# Patient Record
Sex: MALE | Race: WHITE | NOT HISPANIC OR LATINO
[De-identification: names, ages, dates, MRNs, and addresses within clinical notes are randomized per-mention and may not be internally consistent; named-entity substitution may affect disease eponyms.]

---

## 2017-07-24 ENCOUNTER — APPOINTMENT (OUTPATIENT)
Dept: FAMILY MEDICINE | Facility: CLINIC | Age: 82
End: 2017-07-24

## 2017-07-24 VITALS
RESPIRATION RATE: 16 BRPM | TEMPERATURE: 98.9 F | BODY MASS INDEX: 28.16 KG/M2 | SYSTOLIC BLOOD PRESSURE: 110 MMHG | WEIGHT: 167 LBS | HEIGHT: 64.75 IN | DIASTOLIC BLOOD PRESSURE: 70 MMHG | OXYGEN SATURATION: 97 % | HEART RATE: 71 BPM

## 2017-07-24 DIAGNOSIS — Z87.09 PERSONAL HISTORY OF OTHER DISEASES OF THE RESPIRATORY SYSTEM: ICD-10-CM

## 2017-08-03 ENCOUNTER — APPOINTMENT (OUTPATIENT)
Dept: FAMILY MEDICINE | Facility: CLINIC | Age: 82
End: 2017-08-03
Payer: MEDICARE

## 2017-08-03 VITALS
HEART RATE: 76 BPM | DIASTOLIC BLOOD PRESSURE: 60 MMHG | SYSTOLIC BLOOD PRESSURE: 110 MMHG | OXYGEN SATURATION: 98 % | BODY MASS INDEX: 27.87 KG/M2 | TEMPERATURE: 98.5 F | HEIGHT: 64.75 IN | WEIGHT: 165.25 LBS

## 2017-08-03 DIAGNOSIS — Z87.09 PERSONAL HISTORY OF OTHER DISEASES OF THE RESPIRATORY SYSTEM: ICD-10-CM

## 2017-08-03 PROCEDURE — 99214 OFFICE O/P EST MOD 30 MIN: CPT

## 2017-09-22 ENCOUNTER — RX RENEWAL (OUTPATIENT)
Age: 82
End: 2017-09-22

## 2017-11-15 ENCOUNTER — APPOINTMENT (OUTPATIENT)
Dept: FAMILY MEDICINE | Facility: CLINIC | Age: 82
End: 2017-11-15
Payer: MEDICARE

## 2017-11-15 VITALS
OXYGEN SATURATION: 98 % | HEIGHT: 64.75 IN | BODY MASS INDEX: 27.83 KG/M2 | DIASTOLIC BLOOD PRESSURE: 72 MMHG | WEIGHT: 165 LBS | SYSTOLIC BLOOD PRESSURE: 130 MMHG | HEART RATE: 67 BPM

## 2017-11-15 PROCEDURE — G0009: CPT

## 2017-11-15 PROCEDURE — 90670 PCV13 VACCINE IM: CPT

## 2017-11-15 PROCEDURE — 36415 COLL VENOUS BLD VENIPUNCTURE: CPT

## 2017-11-15 PROCEDURE — 99214 OFFICE O/P EST MOD 30 MIN: CPT | Mod: 25

## 2017-11-15 RX ORDER — AZITHROMYCIN 250 MG/1
250 TABLET, FILM COATED ORAL
Qty: 1 | Refills: 0 | Status: DISCONTINUED | COMMUNITY
Start: 2017-08-03 | End: 2017-11-15

## 2017-11-16 LAB
ALBUMIN SERPL ELPH-MCNC: 4.2 G/DL
ALP BLD-CCNC: 57 U/L
ALT SERPL-CCNC: 43 U/L
ANION GAP SERPL CALC-SCNC: 15 MMOL/L
AST SERPL-CCNC: 35 U/L
BILIRUB SERPL-MCNC: 0.4 MG/DL
BUN SERPL-MCNC: 11 MG/DL
CALCIUM SERPL-MCNC: 9.9 MG/DL
CHLORIDE SERPL-SCNC: 104 MMOL/L
CHOLEST SERPL-MCNC: 196 MG/DL
CHOLEST/HDLC SERPL: 5.3 RATIO
CO2 SERPL-SCNC: 24 MMOL/L
CREAT SERPL-MCNC: 0.79 MG/DL
GLUCOSE SERPL-MCNC: 134 MG/DL
HBA1C MFR BLD HPLC: 6 %
HDLC SERPL-MCNC: 37 MG/DL
LDLC SERPL CALC-MCNC: 123 MG/DL
POTASSIUM SERPL-SCNC: 4.7 MMOL/L
PROT SERPL-MCNC: 7.5 G/DL
SODIUM SERPL-SCNC: 143 MMOL/L
TRIGL SERPL-MCNC: 182 MG/DL

## 2017-11-17 ENCOUNTER — RX RENEWAL (OUTPATIENT)
Age: 82
End: 2017-11-17

## 2018-11-21 ENCOUNTER — RX RENEWAL (OUTPATIENT)
Age: 83
End: 2018-11-21

## 2019-02-28 ENCOUNTER — APPOINTMENT (OUTPATIENT)
Dept: FAMILY MEDICINE | Facility: CLINIC | Age: 84
End: 2019-02-28
Payer: MEDICARE

## 2019-02-28 VITALS
TEMPERATURE: 98.01 F | WEIGHT: 170 LBS | HEART RATE: 84 BPM | HEIGHT: 65 IN | DIASTOLIC BLOOD PRESSURE: 74 MMHG | BODY MASS INDEX: 28.32 KG/M2 | SYSTOLIC BLOOD PRESSURE: 126 MMHG | OXYGEN SATURATION: 97 %

## 2019-02-28 DIAGNOSIS — E78.00 PURE HYPERCHOLESTEROLEMIA, UNSPECIFIED: ICD-10-CM

## 2019-02-28 PROCEDURE — G0439: CPT

## 2019-02-28 NOTE — HEALTH RISK ASSESSMENT
[Good] : ~his/her~  mood as  good [No falls in past year] : Patient reported no falls in the past year [Patient reported colonoscopy was normal] : Patient reported colonoscopy was normal [With Family] : lives with family [Fully functional (bathing, dressing, toileting, transferring, walking, feeding)] : Fully functional (bathing, dressing, toileting, transferring, walking, feeding) [Fully functional (using the telephone, shopping, preparing meals, housekeeping, doing laundry, using] : Fully functional and needs no help or supervision to perform IADLs (using the telephone, shopping, preparing meals, housekeeping, doing laundry, using transportation, managing medications and managing finances) [Reports changes in hearing] : Reports changes in hearing [Seat Belt] :  uses seat belt [Aggressive treatment] : aggressive treatment [30 or more] : 30 or more [] : No [YearQuit] : 2009 [de-identified] : Occasional [de-identified] : Active [de-identified] : healthy [0] : 0 [Change in mental status noted] : No change in mental status noted [Sexually Active] : not sexually active [Reports changes in dental health] : Reports no changes in dental health [ColonoscopyDate] : 2011 [Designated Healthcare Proxy] : Designated healthcare proxy [AdvancecareDate] : Today

## 2019-02-28 NOTE — PHYSICAL EXAM
[No Acute Distress] : no acute distress [Clear to Auscultation] : lungs were clear to auscultation bilaterally [Regular Rhythm] : with a regular rhythm [No Murmur] : no murmur heard [No Carotid Bruits] : no carotid bruits [No Edema] : there was no peripheral edema [Soft] : abdomen soft [No Rash] : no rash [Normal Insight/Judgement] : insight and judgment were intact

## 2019-02-28 NOTE — HISTORY OF PRESENT ILLNESS
[FreeTextEntry1] : Yearly physical [de-identified] : Status post coronary stents several years ago, has no cardiovascular symptoms with exertion. Plans to followup with his cardiologist in the near future. He is physically active . No issues.\par \par Neuropathy well controlled with gabapentin, which he takes 3 times a day told he may try and taper off.\par \par Status post lung resection 27 years ago. No pulmonary complaints

## 2019-02-28 NOTE — ASSESSMENT
[FreeTextEntry1] : Status post coronary stents. Patient remains asymptomatic. Followup with cardiologist.\par \par Mild paresthesias. Patient will try and discontinue gabapentin\par \par \par Patient had lobectomy 27 years ago for lung cancer. CAT scan in 2016 was unremarkable. Patient qualifies for lung cancer screen. Low-dose CT ordered

## 2019-03-01 LAB
ALBUMIN SERPL ELPH-MCNC: 4.6 G/DL
ALP BLD-CCNC: 63 U/L
ALT SERPL-CCNC: 45 U/L
ANION GAP SERPL CALC-SCNC: 13 MMOL/L
AST SERPL-CCNC: 34 U/L
BASOPHILS # BLD AUTO: 0.07 K/UL
BASOPHILS NFR BLD AUTO: 0.8 %
BILIRUB SERPL-MCNC: 0.6 MG/DL
BUN SERPL-MCNC: 9 MG/DL
CALCIUM SERPL-MCNC: 9.8 MG/DL
CHLORIDE SERPL-SCNC: 101 MMOL/L
CHOLEST SERPL-MCNC: 183 MG/DL
CHOLEST/HDLC SERPL: 5 RATIO
CO2 SERPL-SCNC: 28 MMOL/L
CREAT SERPL-MCNC: 0.76 MG/DL
EOSINOPHIL # BLD AUTO: 0.26 K/UL
EOSINOPHIL NFR BLD AUTO: 2.9 %
GLUCOSE SERPL-MCNC: 146 MG/DL
HBA1C MFR BLD HPLC: 6.4 %
HCT VFR BLD CALC: 49.6 %
HDLC SERPL-MCNC: 37 MG/DL
HGB BLD-MCNC: 16.7 G/DL
IMM GRANULOCYTES NFR BLD AUTO: 0.3 %
LDLC SERPL CALC-MCNC: 100 MG/DL
LYMPHOCYTES # BLD AUTO: 1.8 K/UL
LYMPHOCYTES NFR BLD AUTO: 19.9 %
MAN DIFF?: NORMAL
MCHC RBC-ENTMCNC: 30 PG
MCHC RBC-ENTMCNC: 33.7 GM/DL
MCV RBC AUTO: 89.2 FL
MONOCYTES # BLD AUTO: 0.78 K/UL
MONOCYTES NFR BLD AUTO: 8.6 %
NEUTROPHILS # BLD AUTO: 6.09 K/UL
NEUTROPHILS NFR BLD AUTO: 67.5 %
PLATELET # BLD AUTO: 187 K/UL
POTASSIUM SERPL-SCNC: 4.4 MMOL/L
PROT SERPL-MCNC: 7.1 G/DL
RBC # BLD: 5.56 M/UL
RBC # FLD: 12.7 %
SODIUM SERPL-SCNC: 142 MMOL/L
TRIGL SERPL-MCNC: 232 MG/DL
WBC # FLD AUTO: 9.03 K/UL

## 2019-03-07 ENCOUNTER — APPOINTMENT (OUTPATIENT)
Dept: CT IMAGING | Facility: CLINIC | Age: 84
End: 2019-03-07

## 2019-03-11 ENCOUNTER — FORM ENCOUNTER (OUTPATIENT)
Age: 84
End: 2019-03-11

## 2019-03-12 ENCOUNTER — APPOINTMENT (OUTPATIENT)
Dept: RADIOLOGY | Facility: CLINIC | Age: 84
End: 2019-03-12
Payer: MEDICARE

## 2019-03-12 ENCOUNTER — OUTPATIENT (OUTPATIENT)
Dept: OUTPATIENT SERVICES | Facility: HOSPITAL | Age: 84
LOS: 1 days | End: 2019-03-12
Payer: MEDICARE

## 2019-03-12 DIAGNOSIS — Z98.890 OTHER SPECIFIED POSTPROCEDURAL STATES: ICD-10-CM

## 2019-03-12 PROCEDURE — 71046 X-RAY EXAM CHEST 2 VIEWS: CPT | Mod: 26

## 2019-03-12 PROCEDURE — 71046 X-RAY EXAM CHEST 2 VIEWS: CPT

## 2019-10-16 ENCOUNTER — APPOINTMENT (OUTPATIENT)
Dept: FAMILY MEDICINE | Facility: CLINIC | Age: 84
End: 2019-10-16
Payer: MEDICARE

## 2019-10-16 VITALS
SYSTOLIC BLOOD PRESSURE: 136 MMHG | HEIGHT: 65 IN | BODY MASS INDEX: 27.82 KG/M2 | DIASTOLIC BLOOD PRESSURE: 60 MMHG | RESPIRATION RATE: 16 BRPM | OXYGEN SATURATION: 97 % | WEIGHT: 167 LBS | HEART RATE: 61 BPM

## 2019-10-16 DIAGNOSIS — R53.81 OTHER MALAISE: ICD-10-CM

## 2019-10-16 PROCEDURE — 36415 COLL VENOUS BLD VENIPUNCTURE: CPT

## 2019-10-16 PROCEDURE — 99214 OFFICE O/P EST MOD 30 MIN: CPT | Mod: 25

## 2019-10-16 NOTE — ASSESSMENT
[FreeTextEntry1] : Brief episode of feeling unwell has since resolved completely. Follow up as needed as routine appointment with cardiology this week

## 2019-10-16 NOTE — HISTORY OF PRESENT ILLNESS
[de-identified] : the patient was evaluated in the emergency room October 9.He awoke feeling a little "off" nothing specific.Felt a little worse as the morning went on. Wife insisted he be evaluated in the emergency room Workup was negative by the time he was discharged he felt completely back to normal. He denies any confusion

## 2019-10-16 NOTE — PHYSICAL EXAM
[Normal] : normal rate, regular rhythm, normal S1 and S2 and no murmur heard [Pedal Pulses Present] : the pedal pulses are present [Coordination Grossly Intact] : coordination grossly intact [No Focal Deficits] : no focal deficits [de-identified] : Examination of feet. Normal vibratory sense normal light touch. Deep tendon reflexes 1+

## 2019-10-17 LAB
CHOLEST SERPL-MCNC: 160 MG/DL
CHOLEST/HDLC SERPL: 4.7 RATIO
ESTIMATED AVERAGE GLUCOSE: 128 MG/DL
HBA1C MFR BLD HPLC: 6.1 %
HDLC SERPL-MCNC: 34 MG/DL
LDLC SERPL CALC-MCNC: 98 MG/DL
TRIGL SERPL-MCNC: 141 MG/DL

## 2019-10-23 ENCOUNTER — OUTPATIENT (OUTPATIENT)
Dept: OUTPATIENT SERVICES | Facility: HOSPITAL | Age: 84
LOS: 1 days | End: 2019-10-23
Payer: MEDICARE

## 2019-10-23 VITALS — HEIGHT: 66 IN | WEIGHT: 169.09 LBS

## 2019-10-23 VITALS
RESPIRATION RATE: 18 BRPM | SYSTOLIC BLOOD PRESSURE: 128 MMHG | DIASTOLIC BLOOD PRESSURE: 61 MMHG | TEMPERATURE: 98 F | HEIGHT: 66 IN | WEIGHT: 169.09 LBS | OXYGEN SATURATION: 96 % | HEART RATE: 62 BPM

## 2019-10-23 DIAGNOSIS — R06.02 SHORTNESS OF BREATH: ICD-10-CM

## 2019-10-23 DIAGNOSIS — Z01.818 ENCOUNTER FOR OTHER PREPROCEDURAL EXAMINATION: ICD-10-CM

## 2019-10-23 DIAGNOSIS — Z95.5 PRESENCE OF CORONARY ANGIOPLASTY IMPLANT AND GRAFT: Chronic | ICD-10-CM

## 2019-10-23 LAB
ALBUMIN SERPL ELPH-MCNC: 4.1 G/DL — SIGNIFICANT CHANGE UP (ref 3.3–5.2)
ALP SERPL-CCNC: 55 U/L — SIGNIFICANT CHANGE UP (ref 40–120)
ALT FLD-CCNC: 35 U/L — SIGNIFICANT CHANGE UP
ANION GAP SERPL CALC-SCNC: 12 MMOL/L — SIGNIFICANT CHANGE UP (ref 5–17)
APTT BLD: 31 SEC — SIGNIFICANT CHANGE UP (ref 27.5–36.3)
AST SERPL-CCNC: 27 U/L — SIGNIFICANT CHANGE UP
BILIRUB SERPL-MCNC: 0.5 MG/DL — SIGNIFICANT CHANGE UP (ref 0.4–2)
BUN SERPL-MCNC: 13 MG/DL — SIGNIFICANT CHANGE UP (ref 8–20)
CALCIUM SERPL-MCNC: 9.3 MG/DL — SIGNIFICANT CHANGE UP (ref 8.6–10.2)
CHLORIDE SERPL-SCNC: 106 MMOL/L — SIGNIFICANT CHANGE UP (ref 98–107)
CO2 SERPL-SCNC: 24 MMOL/L — SIGNIFICANT CHANGE UP (ref 22–29)
CREAT SERPL-MCNC: 0.61 MG/DL — SIGNIFICANT CHANGE UP (ref 0.5–1.3)
GLUCOSE SERPL-MCNC: 136 MG/DL — HIGH (ref 70–115)
HCT VFR BLD CALC: 45.5 % — SIGNIFICANT CHANGE UP (ref 39–50)
HGB BLD-MCNC: 15.6 G/DL — SIGNIFICANT CHANGE UP (ref 13–17)
INR BLD: 0.98 RATIO — SIGNIFICANT CHANGE UP (ref 0.88–1.16)
MCHC RBC-ENTMCNC: 30.3 PG — SIGNIFICANT CHANGE UP (ref 27–34)
MCHC RBC-ENTMCNC: 34.3 GM/DL — SIGNIFICANT CHANGE UP (ref 32–36)
MCV RBC AUTO: 88.3 FL — SIGNIFICANT CHANGE UP (ref 80–100)
PLATELET # BLD AUTO: 186 K/UL — SIGNIFICANT CHANGE UP (ref 150–400)
POTASSIUM SERPL-MCNC: 4.1 MMOL/L — SIGNIFICANT CHANGE UP (ref 3.5–5.3)
POTASSIUM SERPL-SCNC: 4.1 MMOL/L — SIGNIFICANT CHANGE UP (ref 3.5–5.3)
PROT SERPL-MCNC: 6.9 G/DL — SIGNIFICANT CHANGE UP (ref 6.6–8.7)
PROTHROM AB SERPL-ACNC: 11.3 SEC — SIGNIFICANT CHANGE UP (ref 10–12.9)
RBC # BLD: 5.15 M/UL — SIGNIFICANT CHANGE UP (ref 4.2–5.8)
RBC # FLD: 12.4 % — SIGNIFICANT CHANGE UP (ref 10.3–14.5)
SODIUM SERPL-SCNC: 142 MMOL/L — SIGNIFICANT CHANGE UP (ref 135–145)
WBC # BLD: 8.26 K/UL — SIGNIFICANT CHANGE UP (ref 3.8–10.5)
WBC # FLD AUTO: 8.26 K/UL — SIGNIFICANT CHANGE UP (ref 3.8–10.5)

## 2019-10-23 PROCEDURE — 93010 ELECTROCARDIOGRAM REPORT: CPT

## 2019-10-23 PROCEDURE — G0463: CPT

## 2019-10-23 PROCEDURE — 36415 COLL VENOUS BLD VENIPUNCTURE: CPT

## 2019-10-23 PROCEDURE — 80053 COMPREHEN METABOLIC PANEL: CPT

## 2019-10-23 PROCEDURE — 85027 COMPLETE CBC AUTOMATED: CPT

## 2019-10-23 PROCEDURE — 85730 THROMBOPLASTIN TIME PARTIAL: CPT

## 2019-10-23 PROCEDURE — 85610 PROTHROMBIN TIME: CPT

## 2019-10-23 PROCEDURE — 93005 ELECTROCARDIOGRAM TRACING: CPT

## 2019-10-23 NOTE — H&P PST ADULT - RS GEN PE MLT RESP DETAILS PC
respirations non-labored/clear to auscultation bilaterally/airway patent/no intercostal retractions/no rales/no chest wall tenderness/normal/good air movement/breath sounds equal

## 2019-10-23 NOTE — H&P PST ADULT - ASSESSMENT
83 y/o M, former smoker, PMHx CAD PCI presents today to PST in anticipation of LHC with Dr. Linton due to increased shortness of breath; abn. stress test.

## 2019-10-23 NOTE — H&P PST ADULT - NSICDXPROBLEM_GEN_ALL_CORE_FT
PROBLEM DIAGNOSES  Problem: Shortness of breath  Assessment and Plan: -LHC to be done with Dr. iLnton  -Procedure d/w pt and daughter, risks and benefits explained, questions answered  -labs ECG and previous card notes reviewed

## 2019-10-23 NOTE — H&P PST ADULT - NEUROLOGICAL DETAILS
responds to pain/responds to verbal commands/alert and oriented x 3/normal strength/deep reflexes intact/cranial nerves intact/no spontaneous movement/sensation intact

## 2019-10-23 NOTE — H&P PST ADULT - NSICDXPASTMEDICALHX_GEN_ALL_CORE_FT
PAST MEDICAL HISTORY:  CAD S/P percutaneous coronary angioplasty stents to LAD and OM in 2006, OM 2 in 2009    CHEW (dyspnea on exertion)     High cholesterol     HTN (hypertension)     MI (myocardial infarction)     Neuropathy

## 2019-10-23 NOTE — H&P PST ADULT - HISTORY OF PRESENT ILLNESS
Narrative: This is an 85 y/o M, former smoker, with a PMHx of CAD/MI/ PCI (LAD, OM1 2006; OM2 2009), HTN, HLD, lung ca (partial lobectomy in 1992) presents today for PST in anticipation of a left heart cardiac catheterization with Dr. Linton.  He recently was in the ER where he was seen and released due to "feeling disoriented and not stable on his feet", however, was stable prior to discharge. He denies chest pain but endorses dyspnea on exertion.     Of note, he had a cardiac catheterization in 2011 that revealed patent stents to the LAD, OM1, and OM2    Nuclear Stress test from 9/19/2019 revealed moderate to severe, basal to mid inferior/ inferolateral, partially reversible at the margins defect, consistent with infarction with mildperi- infarct ischemia, LV function mildly reduced, LV wall motion abnormal, hypokinesis of the proximal to mid inferior and inferiorlateral walls, EF 56%    TTE from 9/5/2019  normal global LV contractility, LV normal with EF 50-55%, mild segmental hypokinesis of LV, diastolic filling pattern indicates impaired relaxation, regional wall motion abnormalities are noted to the basal inferior segment and mid inferolateral segment, mild MR, mild TR, no changes from previous study.    Cardiac Cath 11/17/2009: RFA access, OM2 EMERSON 2.50x 30mm [place] sprint EMERSON, LM normal, LAD widely patent stent, RCA normal    ASA:  Mallampati:  Bleeding Risk:  Creatinine:  GFR: Narrative: This is an 85 y/o M, former smoker (1PPD x55 years, quit 9 years ago), with a PMHx of CAD/MI/ PCI (LAD, OM1 2006; OM2 2009), HTN, HLD, lung ca (partial lobectomy in 1992) presents today for PST in anticipation of a left heart cardiac catheterization with Dr. Linton.  He recently was in the ER where he was seen and released due to "feeling disoriented and not stable on his feet", however, was stable prior to discharge. He reports in the morning prior to coming to the ED; he got out of bed fast and believes his feeling was due to brisk position change.  He denies chest pain, palps, lower extremity swelling; but endorses dyspnea on exertion.     Of note, he had a cardiac catheterization in 2011 that revealed patent stents to the LAD, OM1, and OM2    Nuclear Stress test from 9/19/2019 revealed moderate to severe, basal to mid inferior/ inferolateral, partially reversible at the margins defect, consistent with infarction with mildperi- infarct ischemia, LV function mildly reduced, LV wall motion abnormal, hypokinesis of the proximal to mid inferior and inferiorlateral walls, EF 56%    TTE from 9/5/2019  normal global LV contractility, LV normal with EF 50-55%, mild segmental hypokinesis of LV, diastolic filling pattern indicates impaired relaxation, regional wall motion abnormalities are noted to the basal inferior segment and mid inferolateral segment, mild MR, mild TR, no changes from previous study.    Cardiac Cath 11/17/2009: RFA access, OM2 EMERSON 2.50x 30mm [place] sprint EMERSON, LM normal, LAD widely patent stent, RCA normal    ASA: 2  Mallampati: 2  Bleeding Risk: 1.6%  Creatinine: 0.61  GFR: 7

## 2019-10-29 ENCOUNTER — TRANSCRIPTION ENCOUNTER (OUTPATIENT)
Age: 84
End: 2019-10-29

## 2019-10-29 ENCOUNTER — INPATIENT (INPATIENT)
Facility: HOSPITAL | Age: 84
LOS: 0 days | Discharge: HOME | DRG: 246 | End: 2019-10-30
Attending: INTERNAL MEDICINE | Admitting: EMERGENCY MEDICINE
Payer: COMMERCIAL

## 2019-10-29 VITALS
HEART RATE: 57 BPM | RESPIRATION RATE: 17 BRPM | SYSTOLIC BLOOD PRESSURE: 175 MMHG | OXYGEN SATURATION: 98 % | DIASTOLIC BLOOD PRESSURE: 77 MMHG | TEMPERATURE: 98 F

## 2019-10-29 DIAGNOSIS — R94.39 ABNORMAL RESULT OF OTHER CARDIOVASCULAR FUNCTION STUDY: ICD-10-CM

## 2019-10-29 DIAGNOSIS — Z95.5 PRESENCE OF CORONARY ANGIOPLASTY IMPLANT AND GRAFT: Chronic | ICD-10-CM

## 2019-10-29 LAB — BLD GP AB SCN SERPL QL: SIGNIFICANT CHANGE UP

## 2019-10-29 PROCEDURE — 93010 ELECTROCARDIOGRAM REPORT: CPT

## 2019-10-29 RX ORDER — CLOPIDOGREL BISULFATE 75 MG/1
75 TABLET, FILM COATED ORAL DAILY
Refills: 0 | Status: DISCONTINUED | OUTPATIENT
Start: 2019-10-30 | End: 2019-10-30

## 2019-10-29 RX ORDER — METOPROLOL TARTRATE 50 MG
25 TABLET ORAL DAILY
Refills: 0 | Status: DISCONTINUED | OUTPATIENT
Start: 2019-10-29 | End: 2019-10-30

## 2019-10-29 RX ORDER — SODIUM CHLORIDE 9 MG/ML
1000 INJECTION INTRAMUSCULAR; INTRAVENOUS; SUBCUTANEOUS
Refills: 0 | Status: DISCONTINUED | OUTPATIENT
Start: 2019-10-29 | End: 2019-10-30

## 2019-10-29 RX ORDER — LOVASTATIN 20 MG
2 TABLET ORAL
Qty: 0 | Refills: 0 | DISCHARGE

## 2019-10-29 RX ORDER — METOPROLOL TARTRATE 50 MG
1 TABLET ORAL
Qty: 0 | Refills: 0 | DISCHARGE

## 2019-10-29 RX ORDER — GABAPENTIN 400 MG/1
100 CAPSULE ORAL THREE TIMES A DAY
Refills: 0 | Status: DISCONTINUED | OUTPATIENT
Start: 2019-10-29 | End: 2019-10-30

## 2019-10-29 RX ORDER — ASPIRIN/CALCIUM CARB/MAGNESIUM 324 MG
1 TABLET ORAL
Qty: 0 | Refills: 0 | DISCHARGE

## 2019-10-29 RX ORDER — GABAPENTIN 400 MG/1
1 CAPSULE ORAL
Qty: 0 | Refills: 0 | DISCHARGE

## 2019-10-29 RX ORDER — ATORVASTATIN CALCIUM 80 MG/1
40 TABLET, FILM COATED ORAL AT BEDTIME
Refills: 0 | Status: DISCONTINUED | OUTPATIENT
Start: 2019-10-29 | End: 2019-10-30

## 2019-10-29 RX ORDER — ASPIRIN/CALCIUM CARB/MAGNESIUM 324 MG
81 TABLET ORAL DAILY
Refills: 0 | Status: DISCONTINUED | OUTPATIENT
Start: 2019-10-30 | End: 2019-10-30

## 2019-10-29 RX ORDER — CLOPIDOGREL BISULFATE 75 MG/1
1 TABLET, FILM COATED ORAL
Qty: 0 | Refills: 0 | DISCHARGE

## 2019-10-29 RX ADMIN — ATORVASTATIN CALCIUM 40 MILLIGRAM(S): 80 TABLET, FILM COATED ORAL at 22:38

## 2019-10-29 RX ADMIN — GABAPENTIN 100 MILLIGRAM(S): 400 CAPSULE ORAL at 22:38

## 2019-10-29 NOTE — DISCHARGE NOTE PROVIDER - CARE PROVIDER_API CALL
Angus Ho)  [facility] at [address] [address]  Phone: [phone]  Fax: [phone]  Follow Up Time: 2 weeks

## 2019-10-29 NOTE — DISCHARGE NOTE PROVIDER - NSDCHC_MEDRECLITE_GEN_ALL_CORE
Click to Modify Medication Indication on Note Save
Click to Modify Medication Indication on Note Save

## 2019-10-29 NOTE — DISCHARGE NOTE PROVIDER - CARE PROVIDER_API CALL
Mark Linton)  Cardiovascular Disease; Interventional Cardiology  [address] [place] [address]  Phone: [phone]  Fax: [phone]  Follow Up Time: 2 weeks

## 2019-10-29 NOTE — DISCHARGE NOTE PROVIDER - NSDCACTIVITY_GEN_ALL_CORE
Driving allowed/Do not drive or operate machinery/Return to Work/School allowed/Sex allowed/Stairs allowed/Showering allowed/Walking - Indoors allowed/Walking - Outdoors allowed/Do not make important decisions

## 2019-10-29 NOTE — PROGRESS NOTE ADULT - SUBJECTIVE AND OBJECTIVE BOX
Narrative: This is an 85 y/o M, former smoker (1PPD x55 years, quit 9 years ago), with a PMHx of CAD/MI/ PCI (LAD, OM1 2006; OM2 2009), HTN, HLD, lung ca (partial lobectomy in 1992) presents today for PST in anticipation of a left heart cardiac catheterization with Dr. Linton.  He recently was in the ER where he was seen and released due to "feeling disoriented and not stable on his feet", however, was stable prior to discharge. He reports in the morning prior to coming to the ED; he got out of bed fast and believes his feeling was due to brisk position change.  He denies chest pain, palps, lower extremity swelling; but endorses dyspnea on exertion.     Of note, he had a cardiac catheterization in 2011 that revealed patent stents to the LAD, OM1, and OM2  Physical Exam:  · Constitutional	Well-developed, well nourished	  · Eyes	EOMI; PERRL; no drainage or redness	  · ENMT	No oral lesions; no gross abnormalities	  · Neck	No bruits; no thyromegaly or nodules	  · Respiratory	detailed exam	  · Respiratory Details	normal; airway patent; breath sounds equal; good air movement; respirations non-labored; clear to auscultation bilaterally; no chest wall tenderness; no intercostal retractions; no rales	  · Cardiovascular	detailed exam	  · Cardiovascular Details	regular rate and rhythm  no murmur	  · Gastrointestinal	Soft, non-tender, no hepatosplenomegaly, normal bowel sounds	  · Genitourinary	not examined	  · Rectal	not examined	  · Extremities	No cyanosis, clubbing or edema	  · Vascular	detailed exam	  · Vascular Details	all pulses palpable peripherally, no edema	  · Neurological	detailed exam	  · Neurological Details	alert and oriented x 3; responds to pain; responds to verbal commands; sensation intact; deep reflexes intact; cranial nerves intact; no spontaneous movement; normal strength	  · Musculoskeletal	No joint pain, swelling or deformity; no limitation of movement	  T(C): 36.8 (10-29-19 @ 10:07), Max: 36.8 (10-29-19 @ 10:07)  HR: 48 (10-29-19 @ 16:05) (48 - 57)  BP: 121/58 (10-29-19 @ 16:05) (118/84 - 175/77)  RR: 18 (10-29-19 @ 16:05) (17 - 18)  SpO2: 96% (10-29-19 @ 16:05) (95% - 98%)

## 2019-10-29 NOTE — DISCHARGE NOTE PROVIDER - NSDCMRMEDTOKEN_GEN_ALL_CORE_FT
aspirin 81 mg oral tablet: 1 tab(s) orally once a day  clopidogrel 75 mg oral tablet: 1 tab(s) orally once a day  gabapentin 100 mg oral capsule: 1 cap(s) orally 3 times a day  lovastatin 20 mg oral tablet: 2 tab(s) orally once a day  mature multivitamin: 1 cap(s) orally once a day  Metoprolol Succinate ER 25 mg oral tablet, extended release: 1 tab(s) orally once a day  Super B Complex oral tablet: 1 tab(s) orally once a day
aspirin 81 mg oral tablet: 1 tab(s) orally once a day  clopidogrel 75 mg oral tablet: 1 tab(s) orally once a day  gabapentin 100 mg oral capsule: 1 cap(s) orally 3 times a day  lovastatin 20 mg oral tablet: 2 tab(s) orally once a day  mature multivitamin: 1 cap(s) orally once a day  Metoprolol Succinate ER 25 mg oral tablet, extended release: 1 tab(s) orally once a day  Super B Complex oral tablet: 1 tab(s) orally once a day

## 2019-10-29 NOTE — PROGRESS NOTE ADULT - ASSESSMENT
cardiac rehab info provided/referral and communication to cardiac rehab completed  admit as outpatient in bed  Monitor overnight on tele  ASA and plavix protocol as ordered  Bedrest for 3 hours post procedure  Follow up as outpatient in 7-10 days

## 2019-10-29 NOTE — DISCHARGE NOTE PROVIDER - HOSPITAL COURSE
EMERSON x 4 LCX    Aspirin and plavix protocol emphasized EMERSON x 4 LCX, OM, OM2    Aspirin and plavix protocol emphasized    Cardiac Cath Lab - Adult (10.29.19 @ 13:35)         LM:   --  LM: Normal.    LAD:   --  Proximal LAD: There was a tubular 0 % stenosis at the site of a    prior stent.    --  Mid LAD: There was a diffuse 0 % stenosis at the site of a prior stent.    CX:   --  Proximal circumflex: There was a tubular 70 % stenosis.    --  OM1: There was a tubular 95 % stenosis at the site of a prior stent.    --  OM2: There was a diffuse 100 % stenosis at the site of a prior stent.    RCA:   --  RCA: Normal.    COMPLICATIONS: No complications occurred during the cath lab visit. No    complications occurred during the cath lab visit.    SUMMARY:    1ST LESION INTERVENTIONS: A successful drug-eluting stent with laser    atherectomy was performed on the 100 % lesion in the 2nd obtuse marginal.    Following intervention there was an excellent angiographic appearance with    a 0 % residual stenosis.    2ND LESION INTERVENTIONS: A successful drug-eluting stent with laser    atherectomy was performed on the 95 % lesion in the 1st obtuse marginal.    Following intervention there was an excellent angiographic appearance with    a 0 % residual stenosis.    3RD LESION INTERVENTIONS: A successful drug-eluting stent was performed on    the 70 % lesion in the proximal circumflex. Following intervention there    was an excellent angiographic appearance with a 0 % residual stenosis.    INTERVENTIONAL RECOMMENDATIONS: Continue clopidogrel (Plavix), 75 mg, PO,    daily. Continue aspirin, 81 mg, PO, daily.

## 2019-10-29 NOTE — DISCHARGE NOTE PROVIDER - NSDCCPCAREPLAN_GEN_ALL_CORE_FT
PRINCIPAL DISCHARGE DIAGNOSIS  Diagnosis: CAD in native artery  Assessment and Plan of Treatment:
PRINCIPAL DISCHARGE DIAGNOSIS  Diagnosis: Severe tricuspid regurgitation  Assessment and Plan of Treatment:

## 2019-10-29 NOTE — DISCHARGE NOTE PROVIDER - HOSPITAL COURSE
Narrative: This is an 85 y/o M, former smoker (1PPD x55 years, quit 9 years ago), with a PMHx of CAD/MI/ PCI (LAD, OM1 2006; OM2 2009), HTN, HLD, lung ca (partial lobectomy in 1992) presents today for PST in anticipation of a left heart cardiac catheterization with Dr. Linton.  He recently was in the ER where he was seen and released due to "feeling disoriented and not stable on his feet", however, was stable prior to discharge. He reports in the morning prior to coming to the ED; he got out of bed fast and believes his feeling was due to brisk position change.    He denies chest pain, palps, lower extremity swelling; but endorses dyspnea on exertion.         Of note, he had a cardiac catheterization in 2011 that revealed patent stents to the LAD, OM1, and [id number] Exam:    · Constitutional    Well-developed, well nourished    · Eyes    EOMI; PERRL; no drainage or redness    · ENMT    No oral lesions; no gross abnormalities    · Neck    No bruits; no thyromegaly or nodules    · Respiratory    detailed exam    · Respiratory Details    normal; airway patent; breath sounds equal; good air movement; respirations non-labored; clear to auscultation bilaterally; no chest wall tenderness; no intercostal retractions; no rales    · Cardiovascular    detailed exam    · Cardiovascular Details    regular rate and rhythm  no murmur    · Gastrointestinal    Soft, non-tender, no hepatosplenomegaly, normal bowel sounds    · Genitourinary    not examined    · Rectal    not examined    · Extremities    No cyanosis, clubbing or edema    · Vascular    detailed exam    · Vascular Details    all pulses palpable peripherally, no edema    · Neurological    detailed exam    · Neurological Details    alert and oriented x 3; responds to pain; responds to verbal commands; sensation intact; deep reflexes intact; cranial nerves intact; no spontaneous movement; normal strength    · Musculoskeletal    No joint pain, swelling or deformity; no limitation of movement    T(C): 36.8 (10-29-19 @ 10:07), Max: 36.8 (10-29-19 @ 10:07)    HR: 57 (10-29-19 @ 10:07) (57 - 57)    BP: 175/77 (10-29-19 @ 10:07) (175/77 - 175/77)    RR: 17 (10-29-19 @ 10:07) (17 - 17)    SpO2: 98% (10-29-19 @ 10:07) (98% - 98%)        S/P Moderate MR and Severe TR     Official report pending    OK to d/c home after tolerating po fluids    Follow up as outpatient in 7-10 days

## 2019-10-30 ENCOUNTER — TRANSCRIPTION ENCOUNTER (OUTPATIENT)
Age: 84
End: 2019-10-30

## 2019-10-30 VITALS
RESPIRATION RATE: 14 BRPM | HEART RATE: 57 BPM | SYSTOLIC BLOOD PRESSURE: 129 MMHG | OXYGEN SATURATION: 98 % | DIASTOLIC BLOOD PRESSURE: 69 MMHG

## 2019-10-30 LAB
ABO RH CONFIRMATION: SIGNIFICANT CHANGE UP
ANION GAP SERPL CALC-SCNC: 14 MMOL/L — SIGNIFICANT CHANGE UP (ref 5–17)
BASOPHILS # BLD AUTO: 0.05 K/UL — SIGNIFICANT CHANGE UP (ref 0–0.2)
BASOPHILS NFR BLD AUTO: 0.5 % — SIGNIFICANT CHANGE UP (ref 0–2)
BUN SERPL-MCNC: 9 MG/DL — SIGNIFICANT CHANGE UP (ref 8–20)
CALCIUM SERPL-MCNC: 8.8 MG/DL — SIGNIFICANT CHANGE UP (ref 8.6–10.2)
CHLORIDE SERPL-SCNC: 104 MMOL/L — SIGNIFICANT CHANGE UP (ref 98–107)
CO2 SERPL-SCNC: 24 MMOL/L — SIGNIFICANT CHANGE UP (ref 22–29)
CREAT SERPL-MCNC: 0.62 MG/DL — SIGNIFICANT CHANGE UP (ref 0.5–1.3)
EOSINOPHIL # BLD AUTO: 0.16 K/UL — SIGNIFICANT CHANGE UP (ref 0–0.5)
EOSINOPHIL NFR BLD AUTO: 1.7 % — SIGNIFICANT CHANGE UP (ref 0–6)
GLUCOSE SERPL-MCNC: 96 MG/DL — SIGNIFICANT CHANGE UP (ref 70–115)
HCT VFR BLD CALC: 43.1 % — SIGNIFICANT CHANGE UP (ref 39–50)
HGB BLD-MCNC: 14.6 G/DL — SIGNIFICANT CHANGE UP (ref 13–17)
IMM GRANULOCYTES NFR BLD AUTO: 0.4 % — SIGNIFICANT CHANGE UP (ref 0–1.5)
LYMPHOCYTES # BLD AUTO: 1.43 K/UL — SIGNIFICANT CHANGE UP (ref 1–3.3)
LYMPHOCYTES # BLD AUTO: 15.1 % — SIGNIFICANT CHANGE UP (ref 13–44)
MCHC RBC-ENTMCNC: 29.6 PG — SIGNIFICANT CHANGE UP (ref 27–34)
MCHC RBC-ENTMCNC: 33.9 GM/DL — SIGNIFICANT CHANGE UP (ref 32–36)
MCV RBC AUTO: 87.4 FL — SIGNIFICANT CHANGE UP (ref 80–100)
MONOCYTES # BLD AUTO: 0.71 K/UL — SIGNIFICANT CHANGE UP (ref 0–0.9)
MONOCYTES NFR BLD AUTO: 7.5 % — SIGNIFICANT CHANGE UP (ref 2–14)
NEUTROPHILS # BLD AUTO: 7.05 K/UL — SIGNIFICANT CHANGE UP (ref 1.8–7.4)
NEUTROPHILS NFR BLD AUTO: 74.8 % — SIGNIFICANT CHANGE UP (ref 43–77)
PLATELET # BLD AUTO: 176 K/UL — SIGNIFICANT CHANGE UP (ref 150–400)
POTASSIUM SERPL-MCNC: 4 MMOL/L — SIGNIFICANT CHANGE UP (ref 3.5–5.3)
POTASSIUM SERPL-SCNC: 4 MMOL/L — SIGNIFICANT CHANGE UP (ref 3.5–5.3)
RBC # BLD: 4.93 M/UL — SIGNIFICANT CHANGE UP (ref 4.2–5.8)
RBC # FLD: 12.4 % — SIGNIFICANT CHANGE UP (ref 10.3–14.5)
SODIUM SERPL-SCNC: 142 MMOL/L — SIGNIFICANT CHANGE UP (ref 135–145)
WBC # BLD: 9.44 K/UL — SIGNIFICANT CHANGE UP (ref 3.8–10.5)
WBC # FLD AUTO: 9.44 K/UL — SIGNIFICANT CHANGE UP (ref 3.8–10.5)

## 2019-10-30 PROCEDURE — 93010 ELECTROCARDIOGRAM REPORT: CPT

## 2019-10-30 RX ADMIN — GABAPENTIN 100 MILLIGRAM(S): 400 CAPSULE ORAL at 05:20

## 2019-10-30 NOTE — DISCHARGE NOTE NURSING/CASE MANAGEMENT/SOCIAL WORK - PATIENT PORTAL LINK FT
You can access the FollowMyHealth Patient Portal offered by [facility] by registering at the following website: [email or web address]. By joining [facility]’s FollowMyHealth portal, you will also be able to view your health information using other applications (apps) compatible with our system.

## 2019-10-30 NOTE — PROGRESS NOTE ADULT - ASSESSMENT
S/P successful PCI LCX, OM1, OM2  Aspirin and plavix protocol emphasized  Continue statin and beta blocker  Follow up as outpatient in 7-10 days   Will monitor.

## 2019-10-30 NOTE — PROGRESS NOTE ADULT - SUBJECTIVE AND OBJECTIVE BOX
[facility]                          [address] [place] [address]                       Phone: [phone]. Fax:[phone]                      ________________________________________________           Reason for follow up/Overnight events: EMERSON LCX/OM    HPI:  This is an 83 y/o M, former smoker (1PPD x55 years, quit 9 years ago), with a PMHx of CAD/MI/ PCI (LAD, OM1 2006; OM2 2009), HTN, HLD, lung ca (partial lobectomy in 1992) presents today for PST in anticipation of a left heart cardiac catheterization with Dr. Linton.  He recently was in the ER where he was seen and released due to "feeling disoriented and not stable on his feet", however, was stable prior to discharge. He reports in the morning prior to coming to the ED; he got out of bed fast and believes his feeling was due to brisk position change.  He denies chest pain, palps, lower extremity swelling; but endorses dyspnea on exertion.     Of note, he had a cardiac catheterization in 2011 that revealed patent stents to the LAD, OM1, and OM2    ROS: All review of systems negative unless indicated otherwise below.                          LAB RESULTS                   COMPLETE BLOOD COUNT( 30 Oct 2019 05:34 )                            14.6 g/dL  9.44 K/uL )---------------( 176 K/uL                        43.1 %      Automated Differential     Auto Basophil # - 0.05 K/uL  Auto Basophil % - 0.5 %  Auto Eosinophil # - 0.16 K/uL  Auto Eosinophil % - 1.7 %  Auto Immature Granulocyte # - X      Auto Immature Granulocyte % - 0.4 %  Auto Lymphocyte # - 1.43 K/uL  Auto Lymphocyte % - 15.1 %  Auto Monocyte # - 0.71 K/uL  Auto Monocyte % - 7.5 %  Auto Neutrophil # - 7.05 K/uL  Auto Neutrophil % - 74.8 %                                  CHEMISTRY                 Basic Metabolic Panel (10-30-19 @ 05:34)    142  |  104  |  9.0  ----------------------------<  96  4.0   |  24.0  |  0.62    Ca    8.8      30 Oct 2019 05:34                    Liver Functions (10-23-19 @ 10:37))  TPro  6.9  /  Alb  4.1  /  TBili  0.5  /  DBili  x   /  AST  27  /  ALT  35  /  AlkPhos  55     PT/INR/PTT ( 23 Oct 2019 10:37 )                        :                       :      11.3         :       31.0                  .        .                   .              .           .       0.98        .                                         CATH: < from: Cardiac Cath Lab - Adult (10.29.19 @ 13:35) >  VENTRICLES: Analysis of regional contractile function demonstrated mild  anterolateral hypokinesis and moderate diaphragmatic hypokinesis. Global  left ventricular function was borderline normal. EF estimated was 50 %.  VALVES: MITRAL VALVE: The mitral valve exhibited mild regurgitation.  CORONARY VESSELS: The coronary circulation is left dominant.  LM:   --  LM: Normal.  LAD:   --  Proximal LAD: There was a tubular 0 % stenosis at the site of a  prior stent.  --  Mid LAD: There was a diffuse 0 % stenosis at the site of a prior stent.  CX:   --  Proximal circumflex: There was a tubular 70 % stenosis.  --  OM1: There was a tubular 95 % stenosis at the site of a prior stent.  --  OM2: There was a diffuse 100 % stenosis at the site of a prior stent.  RCA:   --  RCA: Normal.  COMPLICATIONS: No complications occurred during the cath lab visit. No  complications occurred during the cath lab visit.  SUMMARY:  1ST LESION INTERVENTIONS: A successful drug-eluting stent with laser  atherectomy was performed on the 100 % lesion in the 2nd obtuse marginal.  Following intervention there was an excellent angiographic appearance with  a 0 % residual stenosis.  2ND LESION INTERVENTIONS: A successful drug-eluting stent with laser  atherectomy was performed on the 95 % lesion in the 1st obtuse marginal.  Following intervention there was anexcellent angiographic appearance with  a 0 % residual stenosis.  3RD LESION INTERVENTIONS: A successful drug-eluting stent was performed on  the 70 % lesion in the proximal circumflex. Following intervention there  was an excellent angiographic appearance with a 0 % residual stenosis.  INTERVENTIONAL RECOMMENDATIONS: Continue clopidogrel (Plavix), 75 mg, PO,  daily. Continue aspirin, 81 mg, PO, daily.                            CARDIOLOGY REVIEW: Personally visualized and reviewed    EKG: NSB with PVC's HR 59 no acute ST changes  Telemetry Last 24h: NSR-NSB occassional PVC's        HOME MEDICATIONS:  aspirin 81 mg oral tablet: 1 tab(s) orally once a day (29 Oct 2019 10:05)  clopidogrel 75 mg oral tablet: 1 tab(s) orally once a day (29 Oct 2019 10:05)  gabapentin 100 mg oral capsule: 1 cap(s) orally 3 times a day (29 Oct 2019 10:05)  lovastatin 20 mg oral tablet: 2 tab(s) orally once a day (29 Oct 2019 10:05)  mature multivitamin: 1 cap(s) orally once a day (29 Oct 2019 10:05)  Metoprolol Succinate ER 25 mg oral tablet, extended release: 1 tab(s) orally once a day (29 Oct 2019 10:05)  Super B Complex oral tablet: 1 tab(s) orally once a day (29 Oct 2019 10:05)                             Current Admission Active Medications    aspirin  chewable 81 milliGRAM(s) Chew daily  atorvastatin 40 milliGRAM(s) Oral at bedtime  clopidogrel Tablet 75 milliGRAM(s) Oral daily  gabapentin 100 milliGRAM(s) Oral three times a day  metoprolol succinate ER 25 milliGRAM(s) Oral daily  sodium chloride 0.9%. 1000 milliLiter(s) (30 mL/Hr) IV Continuous <Continuous>                        PHYSICAL EXAM:    Vital Signs Last 24 Hrs  T(C): 36.8 (29 Oct 2019 10:07), Max: 36.8 (29 Oct 2019 10:07)  T(F): 98.3 (29 Oct 2019 10:07), Max: 98.3 (29 Oct 2019 10:07)  HR: 57 (30 Oct 2019 07:15) (29 - 63)  BP: 129/69 (30 Oct 2019 07:15) (105/54 - 175/77)  BP(mean): --  RR: 14 (30 Oct 2019 07:15) (14 - 18)  SpO2: 98% (30 Oct 2019 07:15) (95% - 98%)    GENERAL: NAD  NECK: Supple, No JVD  NERVOUS SYSTEM:  Alert & Oriented X3, non focal neuro exam.   CHEST/LUNG: clear lungs, No rales, rhonchi, wheezing, or rubs  HEART: Regular rate and rhythm; s1 and s2 auscultated, No murmurs, rubs, or gallops  ABDOMEN: Soft, Nontender, Nondistended; Bowel sounds present and normoactive.   EXTREMITIES:  2+ Peripheral Pulses, No clubbing, cyanosis, or edema  CATH SITE:  Right groin site benign, angioseal, no bleeding no hematoma noted

## 2019-11-12 PROCEDURE — C9603: CPT | Mod: LC

## 2019-11-12 PROCEDURE — C9602: CPT | Mod: LC

## 2019-11-12 PROCEDURE — 85027 COMPLETE CBC AUTOMATED: CPT

## 2019-11-12 PROCEDURE — 92934: CPT

## 2019-11-12 PROCEDURE — 80048 BASIC METABOLIC PNL TOTAL CA: CPT

## 2019-11-12 PROCEDURE — C1887: CPT

## 2019-11-12 PROCEDURE — 92978 ENDOLUMINL IVUS OCT C 1ST: CPT

## 2019-11-12 PROCEDURE — 99153 MOD SED SAME PHYS/QHP EA: CPT

## 2019-11-12 PROCEDURE — C1769: CPT

## 2019-11-12 PROCEDURE — 86900 BLOOD TYPING SEROLOGIC ABO: CPT

## 2019-11-12 PROCEDURE — 93458 L HRT ARTERY/VENTRICLE ANGIO: CPT

## 2019-11-12 PROCEDURE — 99152 MOD SED SAME PHYS/QHP 5/>YRS: CPT

## 2019-11-12 PROCEDURE — C1885: CPT

## 2019-11-12 PROCEDURE — 93005 ELECTROCARDIOGRAM TRACING: CPT

## 2019-11-12 PROCEDURE — C1753: CPT

## 2019-11-12 PROCEDURE — C1894: CPT

## 2019-11-12 PROCEDURE — 92943 PRQ TRLUML REVSC CH OCC ANT: CPT

## 2019-11-12 PROCEDURE — 36415 COLL VENOUS BLD VENIPUNCTURE: CPT

## 2019-11-12 PROCEDURE — 86901 BLOOD TYPING SEROLOGIC RH(D): CPT

## 2019-11-12 PROCEDURE — 86850 RBC ANTIBODY SCREEN: CPT

## 2019-11-12 PROCEDURE — C1725: CPT

## 2019-11-12 PROCEDURE — C1760: CPT

## 2019-11-12 PROCEDURE — C1874: CPT

## 2020-03-08 ENCOUNTER — RX RENEWAL (OUTPATIENT)
Age: 85
End: 2020-03-08

## 2020-04-25 ENCOUNTER — RX RENEWAL (OUTPATIENT)
Age: 85
End: 2020-04-25

## 2020-08-27 PROBLEM — I25.10 ATHEROSCLEROTIC HEART DISEASE OF NATIVE CORONARY ARTERY WITHOUT ANGINA PECTORIS: Chronic | Status: ACTIVE | Noted: 2019-10-23

## 2020-08-27 PROBLEM — I21.9 ACUTE MYOCARDIAL INFARCTION, UNSPECIFIED: Chronic | Status: ACTIVE | Noted: 2019-10-23

## 2020-08-27 PROBLEM — R06.09 OTHER FORMS OF DYSPNEA: Chronic | Status: ACTIVE | Noted: 2019-10-23

## 2020-08-27 PROBLEM — G62.9 POLYNEUROPATHY, UNSPECIFIED: Chronic | Status: ACTIVE | Noted: 2019-10-23

## 2020-08-27 PROBLEM — I10 ESSENTIAL (PRIMARY) HYPERTENSION: Chronic | Status: ACTIVE | Noted: 2019-10-23

## 2020-08-27 PROBLEM — E78.00 PURE HYPERCHOLESTEROLEMIA, UNSPECIFIED: Chronic | Status: ACTIVE | Noted: 2019-10-23

## 2020-10-21 ENCOUNTER — RX RENEWAL (OUTPATIENT)
Age: 85
End: 2020-10-21

## 2020-12-09 ENCOUNTER — RX RENEWAL (OUTPATIENT)
Age: 85
End: 2020-12-09

## 2020-12-15 PROBLEM — Z87.09 HISTORY OF ACUTE PHARYNGITIS: Status: RESOLVED | Noted: 2017-07-24 | Resolved: 2020-12-15

## 2020-12-15 PROBLEM — Z87.09 HISTORY OF ACUTE BRONCHITIS: Status: RESOLVED | Noted: 2017-08-03 | Resolved: 2020-12-15

## 2020-12-16 ENCOUNTER — APPOINTMENT (OUTPATIENT)
Dept: FAMILY MEDICINE | Facility: CLINIC | Age: 85
End: 2020-12-16
Payer: MEDICARE

## 2020-12-16 VITALS
OXYGEN SATURATION: 99 % | DIASTOLIC BLOOD PRESSURE: 78 MMHG | TEMPERATURE: 97.2 F | HEIGHT: 66 IN | WEIGHT: 165.5 LBS | BODY MASS INDEX: 26.6 KG/M2 | HEART RATE: 70 BPM | SYSTOLIC BLOOD PRESSURE: 120 MMHG

## 2020-12-16 PROCEDURE — G0439: CPT

## 2020-12-16 NOTE — HEALTH RISK ASSESSMENT
[Very Good] : ~his/her~  mood as very good [] : No [No] : No [No falls in past year] : Patient reported no falls in the past year [0] : 2) Feeling down, depressed, or hopeless: Not at all (0) [Patient reported colonoscopy was normal] : Patient reported colonoscopy was normal [Change in mental status noted] : No change in mental status noted [With Family] : lives with family [] :  [Fully functional (bathing, dressing, toileting, transferring, walking, feeding)] : Fully functional (bathing, dressing, toileting, transferring, walking, feeding) [Fully functional (using the telephone, shopping, preparing meals, housekeeping, doing laundry, using] : Fully functional and needs no help or supervision to perform IADLs (using the telephone, shopping, preparing meals, housekeeping, doing laundry, using transportation, managing medications and managing finances) [Reports changes in hearing] : Reports no changes in hearing [Seat Belt] :  uses seat belt [ColonoscopyDate] : 11/11

## 2020-12-16 NOTE — HISTORY OF PRESENT ILLNESS
[FreeTextEntry1] : Yearly physical [de-identified] : No significant complaints.  Saw Dr. Linton status post cardiac stents had several cardiological tests in the past couple of weeks results of which were normal\par \par Paresthesias have dissipated patient will give trial without gabapentin\par \par Nocturia x1 fairly good urinary stream\par \par Occasional constipation no blood in stool recommend fiber supplement

## 2020-12-16 NOTE — ASSESSMENT
[FreeTextEntry1] : Coronary artery disease patient remained stable\par \par Elevated A1c check it\par \par Therapeutic trial without gabapentin

## 2020-12-17 LAB
ALBUMIN SERPL ELPH-MCNC: 4.4 G/DL
ALP BLD-CCNC: 63 U/L
ALT SERPL-CCNC: 32 U/L
ANION GAP SERPL CALC-SCNC: 11 MMOL/L
AST SERPL-CCNC: 26 U/L
BASOPHILS # BLD AUTO: 0.07 K/UL
BASOPHILS NFR BLD AUTO: 0.8 %
BILIRUB SERPL-MCNC: 0.5 MG/DL
BUN SERPL-MCNC: 10 MG/DL
CALCIUM SERPL-MCNC: 9.5 MG/DL
CHLORIDE SERPL-SCNC: 102 MMOL/L
CHOLEST SERPL-MCNC: 162 MG/DL
CO2 SERPL-SCNC: 29 MMOL/L
CREAT SERPL-MCNC: 0.84 MG/DL
EOSINOPHIL # BLD AUTO: 0.3 K/UL
EOSINOPHIL NFR BLD AUTO: 3.4 %
ESTIMATED AVERAGE GLUCOSE: 120 MG/DL
GLUCOSE SERPL-MCNC: 120 MG/DL
HBA1C MFR BLD HPLC: 5.8 %
HCT VFR BLD CALC: 48.7 %
HDLC SERPL-MCNC: 37 MG/DL
HGB BLD-MCNC: 16 G/DL
IMM GRANULOCYTES NFR BLD AUTO: 0.2 %
LDLC SERPL CALC-MCNC: 89 MG/DL
LYMPHOCYTES # BLD AUTO: 1.75 K/UL
LYMPHOCYTES NFR BLD AUTO: 19.9 %
MAN DIFF?: NORMAL
MCHC RBC-ENTMCNC: 29.7 PG
MCHC RBC-ENTMCNC: 32.9 GM/DL
MCV RBC AUTO: 90.4 FL
MONOCYTES # BLD AUTO: 0.72 K/UL
MONOCYTES NFR BLD AUTO: 8.2 %
NEUTROPHILS # BLD AUTO: 5.95 K/UL
NEUTROPHILS NFR BLD AUTO: 67.5 %
NONHDLC SERPL-MCNC: 125 MG/DL
PLATELET # BLD AUTO: 175 K/UL
POTASSIUM SERPL-SCNC: 4.5 MMOL/L
PROT SERPL-MCNC: 6.8 G/DL
RBC # BLD: 5.39 M/UL
RBC # FLD: 12.9 %
SODIUM SERPL-SCNC: 142 MMOL/L
TRIGL SERPL-MCNC: 182 MG/DL
WBC # FLD AUTO: 8.81 K/UL

## 2020-12-28 ENCOUNTER — RX RENEWAL (OUTPATIENT)
Age: 85
End: 2020-12-28

## 2021-08-30 ENCOUNTER — RX RENEWAL (OUTPATIENT)
Age: 86
End: 2021-08-30

## 2021-10-21 ENCOUNTER — APPOINTMENT (OUTPATIENT)
Dept: FAMILY MEDICINE | Facility: CLINIC | Age: 86
End: 2021-10-21
Payer: MEDICARE

## 2021-10-21 VITALS
OXYGEN SATURATION: 98 % | HEART RATE: 82 BPM | DIASTOLIC BLOOD PRESSURE: 78 MMHG | TEMPERATURE: 97.4 F | SYSTOLIC BLOOD PRESSURE: 124 MMHG | WEIGHT: 162 LBS | HEIGHT: 66 IN | BODY MASS INDEX: 26.03 KG/M2

## 2021-10-21 DIAGNOSIS — Z98.890 OTHER SPECIFIED POSTPROCEDURAL STATES: ICD-10-CM

## 2021-10-21 PROCEDURE — 99214 OFFICE O/P EST MOD 30 MIN: CPT

## 2021-10-21 RX ORDER — METOPROLOL SUCCINATE 25 MG/1
25 TABLET, EXTENDED RELEASE ORAL
Qty: 90 | Refills: 3 | Status: ACTIVE | COMMUNITY
Start: 2017-09-28 | End: 1900-01-01

## 2021-10-21 NOTE — HEALTH RISK ASSESSMENT
[] : No [No] : No [No falls in past year] : Patient reported no falls in the past year [PHQ-2 Negative - No further assessment needed] : PHQ-2 Negative - No further assessment needed

## 2021-10-21 NOTE — HISTORY OF PRESENT ILLNESS
[de-identified] : Here for checkup patient has had flu shot intends to get Covid booster\par \par No complaints other than numbness of the soles of his feet gabapentin was not helpful the symptom is stable chronic there is no weakness.  Must be careful walking has not fallen\par \par No cardiovascular symptoms follows up with cardiology\par \par No cough status post lobectomy for lung cancer many years ago

## 2021-10-21 NOTE — ASSESSMENT
[FreeTextEntry1] : Cardiovascular disease no chest pain status post lung cancer no cough no shortness of breath check labs

## 2021-10-22 DIAGNOSIS — Z23 ENCOUNTER FOR IMMUNIZATION: ICD-10-CM

## 2021-10-23 ENCOUNTER — APPOINTMENT (OUTPATIENT)
Dept: FAMILY MEDICINE | Facility: CLINIC | Age: 86
End: 2021-10-23
Payer: MEDICARE

## 2021-10-23 PROCEDURE — 0003A: CPT

## 2021-10-24 LAB
ALBUMIN SERPL ELPH-MCNC: 4.6 G/DL
ALP BLD-CCNC: 63 U/L
ALT SERPL-CCNC: 26 U/L
ANION GAP SERPL CALC-SCNC: 11 MMOL/L
AST SERPL-CCNC: 22 U/L
BASOPHILS # BLD AUTO: 0.06 K/UL
BASOPHILS NFR BLD AUTO: 0.7 %
BILIRUB SERPL-MCNC: 0.7 MG/DL
BUN SERPL-MCNC: 9 MG/DL
CALCIUM SERPL-MCNC: 9.5 MG/DL
CHLORIDE SERPL-SCNC: 103 MMOL/L
CHOLEST SERPL-MCNC: 172 MG/DL
CO2 SERPL-SCNC: 27 MMOL/L
CREAT SERPL-MCNC: 0.73 MG/DL
EOSINOPHIL # BLD AUTO: 0.31 K/UL
EOSINOPHIL NFR BLD AUTO: 3.5 %
ESTIMATED AVERAGE GLUCOSE: 123 MG/DL
GLUCOSE SERPL-MCNC: 129 MG/DL
HBA1C MFR BLD HPLC: 5.9 %
HCT VFR BLD CALC: 48.5 %
HDLC SERPL-MCNC: 38 MG/DL
HGB BLD-MCNC: 16.3 G/DL
IMM GRANULOCYTES NFR BLD AUTO: 0.3 %
LDLC SERPL CALC-MCNC: 96 MG/DL
LYMPHOCYTES # BLD AUTO: 1.9 K/UL
LYMPHOCYTES NFR BLD AUTO: 21.6 %
MAN DIFF?: NORMAL
MCHC RBC-ENTMCNC: 30 PG
MCHC RBC-ENTMCNC: 33.6 GM/DL
MCV RBC AUTO: 89.3 FL
MONOCYTES # BLD AUTO: 0.7 K/UL
MONOCYTES NFR BLD AUTO: 8 %
NEUTROPHILS # BLD AUTO: 5.79 K/UL
NEUTROPHILS NFR BLD AUTO: 65.9 %
NONHDLC SERPL-MCNC: 134 MG/DL
PLATELET # BLD AUTO: 219 K/UL
POTASSIUM SERPL-SCNC: 4.2 MMOL/L
PROT SERPL-MCNC: 6.9 G/DL
RBC # BLD: 5.43 M/UL
RBC # FLD: 13.1 %
SODIUM SERPL-SCNC: 141 MMOL/L
TRIGL SERPL-MCNC: 194 MG/DL
WBC # FLD AUTO: 8.79 K/UL

## 2023-05-23 NOTE — H&P PST ADULT - VENOUS THROMBOEMBOLISM CURRENT STATUS
Detail Level: Detailed (1) other risk factor (includes escalating BMI, pack-years of smoking, diabetes requiring insulin, chemotherapy, female gender and length of surgery)

## 2023-08-04 ENCOUNTER — APPOINTMENT (OUTPATIENT)
Dept: FAMILY MEDICINE | Facility: CLINIC | Age: 88
End: 2023-08-04
Payer: MEDICARE

## 2023-08-04 VITALS
BODY MASS INDEX: 26.36 KG/M2 | WEIGHT: 164 LBS | DIASTOLIC BLOOD PRESSURE: 80 MMHG | RESPIRATION RATE: 16 BRPM | TEMPERATURE: 97.2 F | SYSTOLIC BLOOD PRESSURE: 148 MMHG | OXYGEN SATURATION: 97 % | HEIGHT: 66 IN | HEART RATE: 60 BPM

## 2023-08-04 DIAGNOSIS — R20.2 PARESTHESIA OF SKIN: ICD-10-CM

## 2023-08-04 DIAGNOSIS — I10 ESSENTIAL (PRIMARY) HYPERTENSION: ICD-10-CM

## 2023-08-04 DIAGNOSIS — Z00.00 ENCOUNTER FOR GENERAL ADULT MEDICAL EXAMINATION W/OUT ABNORMAL FINDINGS: ICD-10-CM

## 2023-08-04 PROCEDURE — G0439: CPT

## 2023-08-04 RX ORDER — ROSUVASTATIN CALCIUM 20 MG/1
20 TABLET, FILM COATED ORAL
Refills: 0 | Status: ACTIVE | COMMUNITY

## 2023-08-04 NOTE — HEALTH RISK ASSESSMENT
[Very Good] : ~his/her~ current health as very good [Good] : ~his/her~  mood as  good [No] : No [No falls in past year] : Patient reported no falls in the past year [PHQ-2 Negative - No further assessment needed] : PHQ-2 Negative - No further assessment needed [Patient reported colonoscopy was normal] : Patient reported colonoscopy was normal [Change in mental status noted] : No change in mental status noted [None] : None [With Significant Other] : lives with significant other [] :  [Fully functional (bathing, dressing, toileting, transferring, walking, feeding)] : Fully functional (bathing, dressing, toileting, transferring, walking, feeding) [Fully functional (using the telephone, shopping, preparing meals, housekeeping, doing laundry, using] : Fully functional and needs no help or supervision to perform IADLs (using the telephone, shopping, preparing meals, housekeeping, doing laundry, using transportation, managing medications and managing finances) [Reports changes in hearing] : Reports changes in hearing [Seat Belt] :  uses seat belt [ColonoscopyDate] : 01/11 [de-identified] : Hearing aids [Former] : Former [> 15 Years] : > 15 Years

## 2023-08-04 NOTE — ASSESSMENT
[FreeTextEntry1] : Status post cardiac stents doing well continue current meds vaccinations up-to-date

## 2023-08-04 NOTE — HISTORY OF PRESENT ILLNESS
[FreeTextEntry1] : Yearly physical [de-identified] : Status post coronary stents sees Dr. Linton regularly no chest pain with exertion  Nocturia x2 or 3 with good stream likes to drink fluids before bed  Has discomfort soles of his feet unresponsive to gabapentin  Status post partial lobectomy 1992  He feels bored encouraged to socialize more often

## 2023-08-05 LAB
ALBUMIN SERPL ELPH-MCNC: 4.4 G/DL
ALP BLD-CCNC: 56 U/L
ALT SERPL-CCNC: 25 U/L
ANION GAP SERPL CALC-SCNC: 10 MMOL/L
AST SERPL-CCNC: 21 U/L
BILIRUB SERPL-MCNC: 0.8 MG/DL
BUN SERPL-MCNC: 10 MG/DL
CALCIUM SERPL-MCNC: 9.8 MG/DL
CHLORIDE SERPL-SCNC: 105 MMOL/L
CHOLEST SERPL-MCNC: 143 MG/DL
CO2 SERPL-SCNC: 26 MMOL/L
CREAT SERPL-MCNC: 0.73 MG/DL
EGFR: 89 ML/MIN/1.73M2
ESTIMATED AVERAGE GLUCOSE: 126 MG/DL
GLUCOSE SERPL-MCNC: 94 MG/DL
HBA1C MFR BLD HPLC: 6 %
HDLC SERPL-MCNC: 38 MG/DL
LDLC SERPL CALC-MCNC: 78 MG/DL
NONHDLC SERPL-MCNC: 105 MG/DL
POTASSIUM SERPL-SCNC: 4.4 MMOL/L
PROT SERPL-MCNC: 6.9 G/DL
SODIUM SERPL-SCNC: 141 MMOL/L
TRIGL SERPL-MCNC: 152 MG/DL

## 2023-11-09 ENCOUNTER — APPOINTMENT (OUTPATIENT)
Dept: FAMILY MEDICINE | Facility: CLINIC | Age: 88
End: 2023-11-09
Payer: MEDICARE

## 2023-11-09 VITALS
HEART RATE: 82 BPM | OXYGEN SATURATION: 98 % | BODY MASS INDEX: 26.03 KG/M2 | DIASTOLIC BLOOD PRESSURE: 68 MMHG | TEMPERATURE: 96.8 F | HEIGHT: 66 IN | SYSTOLIC BLOOD PRESSURE: 120 MMHG | WEIGHT: 162 LBS

## 2023-11-09 DIAGNOSIS — M75.41 IMPINGEMENT SYNDROME OF RIGHT SHOULDER: ICD-10-CM

## 2023-11-09 PROCEDURE — 99213 OFFICE O/P EST LOW 20 MIN: CPT | Mod: 25

## 2023-11-09 PROCEDURE — 96372 THER/PROPH/DIAG INJ SC/IM: CPT

## 2023-11-09 RX ORDER — METHYLPRED ACET/NACL,ISO-OS/PF 40 MG/ML
40 VIAL (ML) INJECTION
Qty: 1 | Refills: 0 | Status: COMPLETED | OUTPATIENT
Start: 2023-11-09

## 2023-11-09 RX ADMIN — METHYLPREDNISOLONE ACETATE 0 MG/ML: 40 INJECTION, SUSPENSION INTRA-ARTICULAR; INTRALESIONAL; INTRAMUSCULAR; INTRASYNOVIAL; SOFT TISSUE at 00:00

## 2023-12-12 ENCOUNTER — APPOINTMENT (OUTPATIENT)
Dept: FAMILY MEDICINE | Facility: CLINIC | Age: 88
End: 2023-12-12
Payer: MEDICARE

## 2023-12-12 VITALS
WEIGHT: 162 LBS | SYSTOLIC BLOOD PRESSURE: 126 MMHG | OXYGEN SATURATION: 96 % | HEIGHT: 66 IN | HEART RATE: 67 BPM | RESPIRATION RATE: 16 BRPM | BODY MASS INDEX: 26.03 KG/M2 | DIASTOLIC BLOOD PRESSURE: 60 MMHG

## 2023-12-12 PROCEDURE — 96372 THER/PROPH/DIAG INJ SC/IM: CPT

## 2023-12-12 PROCEDURE — 99213 OFFICE O/P EST LOW 20 MIN: CPT | Mod: 25

## 2023-12-12 RX ORDER — METHYLPRED ACET/NACL,ISO-OS/PF 40 MG/ML
40 VIAL (ML) INJECTION
Qty: 1 | Refills: 0 | Status: COMPLETED | OUTPATIENT
Start: 2023-12-12

## 2023-12-12 RX ADMIN — METHYLPREDNISOLONE ACETATE 0 MG/ML: 40 INJECTION, SUSPENSION INTRA-ARTICULAR; INTRALESIONAL; INTRAMUSCULAR; INTRASYNOVIAL; SOFT TISSUE at 00:00

## 2024-01-29 ENCOUNTER — NON-APPOINTMENT (OUTPATIENT)
Age: 89
End: 2024-01-29

## 2024-02-09 DIAGNOSIS — M75.42 IMPINGEMENT SYNDROME OF LEFT SHOULDER: ICD-10-CM

## 2024-03-26 ENCOUNTER — APPOINTMENT (OUTPATIENT)
Dept: FAMILY MEDICINE | Facility: CLINIC | Age: 89
End: 2024-03-26
Payer: MEDICARE

## 2024-03-26 VITALS
HEART RATE: 60 BPM | DIASTOLIC BLOOD PRESSURE: 66 MMHG | SYSTOLIC BLOOD PRESSURE: 140 MMHG | HEIGHT: 66 IN | WEIGHT: 157.25 LBS | OXYGEN SATURATION: 98 % | TEMPERATURE: 98.2 F | BODY MASS INDEX: 25.27 KG/M2

## 2024-03-26 DIAGNOSIS — R73.09 OTHER ABNORMAL GLUCOSE: ICD-10-CM

## 2024-03-26 DIAGNOSIS — I25.10 ATHEROSCLEROTIC HEART DISEASE OF NATIVE CORONARY ARTERY W/OUT ANGINA PECTORIS: ICD-10-CM

## 2024-03-26 LAB
BILIRUB UR QL STRIP: NORMAL
GLUCOSE UR-MCNC: NORMAL
HCG UR QL: 8 EU/DL
HGB UR QL STRIP.AUTO: NORMAL
KETONES UR-MCNC: NORMAL
LEUKOCYTE ESTERASE UR QL STRIP: NORMAL
NITRITE UR QL STRIP: POSITIVE
PH UR STRIP: 9
PROT UR STRIP-MCNC: 300
SP GR UR STRIP: 1

## 2024-03-26 PROCEDURE — 81003 URINALYSIS AUTO W/O SCOPE: CPT | Mod: QW

## 2024-03-26 PROCEDURE — 99214 OFFICE O/P EST MOD 30 MIN: CPT

## 2024-03-26 NOTE — PHYSICAL EXAM
[Normal] : normal rate, regular rhythm, normal S1 and S2 and no murmur heard [de-identified] : bright red urine

## 2024-03-26 NOTE — HISTORY OF PRESENT ILLNESS
[FreeTextEntry8] : PT presenting for 1 day of acute concern of blood in the urine.  HAd 3 episodes of hematuria, bright red bloo din urine had 1 episode of regular urine without blood denies buring, itching, discharge felt as if he was "passing blood clots" no hx of kidney stones, no current back pain   hx of CAD s/p stent placement- on plavix 75 mg daily   Last A1c was 6.0 no recently trauma or new meds

## 2024-03-26 NOTE — ASSESSMENT
[FreeTextEntry1] : Hematuria- UA showed glucose, protein, bili, nitrates and leukocytes per pt denies any urinary symptoms besides blood  advised poss UTI vs trauma/bleed 2/2 plavix use unable to leave urine sample today for culture,  check labs to r/o glomerulonephritis, DM, kidney dx  f/u urology for cystoscopy   will drop off Urine sample for culture george AM

## 2024-03-26 NOTE — REVIEW OF SYSTEMS
[Dysuria] : no dysuria [Incontinence] : no incontinence [Hesitancy] : hesitancy [Nocturia] : nocturia [Hematuria] : hematuria [Frequency] : no frequency [Negative] : Constitutional

## 2024-03-27 ENCOUNTER — APPOINTMENT (OUTPATIENT)
Dept: UROLOGY | Facility: CLINIC | Age: 89
End: 2024-03-27
Payer: MEDICARE

## 2024-03-27 VITALS
SYSTOLIC BLOOD PRESSURE: 131 MMHG | HEIGHT: 66 IN | HEART RATE: 112 BPM | DIASTOLIC BLOOD PRESSURE: 79 MMHG | WEIGHT: 155 LBS | BODY MASS INDEX: 24.91 KG/M2

## 2024-03-27 DIAGNOSIS — R33.9 RETENTION OF URINE, UNSPECIFIED: ICD-10-CM

## 2024-03-27 DIAGNOSIS — R31.9 HEMATURIA, UNSPECIFIED: ICD-10-CM

## 2024-03-27 LAB
ALBUMIN SERPL ELPH-MCNC: 4.4 G/DL
ALP BLD-CCNC: 63 U/L
ALT SERPL-CCNC: 26 U/L
ANION GAP SERPL CALC-SCNC: 9 MMOL/L
APPEARANCE: ABNORMAL
AST SERPL-CCNC: 25 U/L
BACTERIA: NEGATIVE /HPF
BASOPHILS # BLD AUTO: 0.07 K/UL
BASOPHILS NFR BLD AUTO: 0.7 %
BILIRUB SERPL-MCNC: 0.6 MG/DL
BILIRUB UR QL STRIP: NORMAL
BILIRUBIN URINE: NORMAL
BLOOD URINE: NORMAL
BUN SERPL-MCNC: 8 MG/DL
CALCIUM SERPL-MCNC: 9.4 MG/DL
CHLORIDE SERPL-SCNC: 102 MMOL/L
CK SERPL-CCNC: 44 U/L
CLARITY UR: NORMAL
CO2 SERPL-SCNC: 29 MMOL/L
COLLECTION METHOD: NORMAL
COLOR: ABNORMAL
CREAT SERPL-MCNC: 0.73 MG/DL
EGFR: 89 ML/MIN/1.73M2
EOSINOPHIL # BLD AUTO: 0.34 K/UL
EOSINOPHIL NFR BLD AUTO: 3.5 %
ESTIMATED AVERAGE GLUCOSE: 114 MG/DL
GLUCOSE QUALITATIVE U: NORMAL
GLUCOSE SERPL-MCNC: 108 MG/DL
GLUCOSE UR-MCNC: NORMAL
HAV IGM SER QL: NONREACTIVE
HBA1C MFR BLD HPLC: 5.6 %
HBV CORE IGM SER QL: NONREACTIVE
HBV SURFACE AG SER QL: NONREACTIVE
HCG UR QL: 0.2 EU/DL
HCT VFR BLD CALC: 44.8 %
HCV AB SER QL: NONREACTIVE
HCV S/CO RATIO: 0.08 S/CO
HGB BLD-MCNC: 15.1 G/DL
HGB UR QL STRIP.AUTO: ABNORMAL
IMM GRANULOCYTES NFR BLD AUTO: 0.4 %
KETONES UR-MCNC: NORMAL
KETONES URINE: NORMAL
LEUKOCYTE ESTERASE UR QL STRIP: NORMAL
LEUKOCYTE ESTERASE URINE: NORMAL
LYMPHOCYTES # BLD AUTO: 1.69 K/UL
LYMPHOCYTES NFR BLD AUTO: 17.6 %
MAN DIFF?: NORMAL
MCHC RBC-ENTMCNC: 30.8 PG
MCHC RBC-ENTMCNC: 33.7 GM/DL
MCV RBC AUTO: 91.2 FL
MICROSCOPIC-UA: NORMAL
MONOCYTES # BLD AUTO: 0.67 K/UL
MONOCYTES NFR BLD AUTO: 7 %
MYOGLOBIN SERPL-MCNC: 33 NG/ML
NEUTROPHILS # BLD AUTO: 6.78 K/UL
NEUTROPHILS NFR BLD AUTO: 70.8 %
NITRITE UR QL STRIP: NORMAL
NITRITE URINE: NORMAL
PH UR STRIP: 7
PH URINE: NORMAL
PLATELET # BLD AUTO: 220 K/UL
POTASSIUM SERPL-SCNC: 4.4 MMOL/L
PROT SERPL-MCNC: 6.9 G/DL
PROT UR STRIP-MCNC: ABNORMAL
PROTEIN URINE: NORMAL
RBC # BLD: 4.91 M/UL
RBC # FLD: 12.6 %
RED BLOOD CELLS URINE: >1900 /HPF
SODIUM SERPL-SCNC: 140 MMOL/L
SP GR UR STRIP: 1.02
SPECIFIC GRAVITY URINE: NORMAL
SQUAMOUS EPITHELIAL CELLS: NORMAL
UROBILINOGEN URINE: NORMAL
WBC # FLD AUTO: 9.59 K/UL
WHITE BLOOD CELLS URINE: 5 /HPF

## 2024-03-27 PROCEDURE — 99203 OFFICE O/P NEW LOW 30 MIN: CPT

## 2024-03-27 PROCEDURE — 51798 US URINE CAPACITY MEASURE: CPT

## 2024-03-27 NOTE — HISTORY OF PRESENT ILLNESS
[FreeTextEntry1] : acute concern of blood in the urine. HAd 3 episodes of hematuria, bright red bloo din urine had 1 episode of regular urine without blood denies buring, itching, discharge felt as if he was "passing blood clots" no hx of kidney stones, no current back pain  hx of CAD s/p stent placement- on plavix 75 mg daily  Last A1c was 6.0 no recently trauma or new meds   [Dysuria] : dysuria [Hematuria - Gross] : gross hematuria [Nausea] : no nausea [Fever] : no fever [None] : None

## 2024-03-28 LAB — BACTERIA UR CULT: NORMAL

## 2024-03-29 ENCOUNTER — APPOINTMENT (OUTPATIENT)
Dept: UROLOGY | Facility: CLINIC | Age: 89
End: 2024-03-29

## 2024-03-29 LAB — URINE CYTOLOGY: NORMAL

## 2024-03-30 LAB — MYOGLOBIN UR-MCNC: 5 NG/ML

## 2024-04-08 ENCOUNTER — OUTPATIENT (OUTPATIENT)
Dept: OUTPATIENT SERVICES | Facility: HOSPITAL | Age: 89
LOS: 1 days | End: 2024-04-08
Payer: MEDICARE

## 2024-04-08 ENCOUNTER — APPOINTMENT (OUTPATIENT)
Dept: CT IMAGING | Facility: CLINIC | Age: 89
End: 2024-04-08
Payer: MEDICARE

## 2024-04-08 DIAGNOSIS — R31.0 GROSS HEMATURIA: ICD-10-CM

## 2024-04-08 DIAGNOSIS — Z95.5 PRESENCE OF CORONARY ANGIOPLASTY IMPLANT AND GRAFT: Chronic | ICD-10-CM

## 2024-04-08 PROCEDURE — 74178 CT ABD&PLV WO CNTR FLWD CNTR: CPT | Mod: 26,MH

## 2024-04-08 PROCEDURE — 74178 CT ABD&PLV WO CNTR FLWD CNTR: CPT

## 2024-04-09 ENCOUNTER — APPOINTMENT (OUTPATIENT)
Dept: UROLOGY | Facility: CLINIC | Age: 89
End: 2024-04-09
Payer: MEDICARE

## 2024-04-09 VITALS
DIASTOLIC BLOOD PRESSURE: 79 MMHG | BODY MASS INDEX: 24.27 KG/M2 | HEIGHT: 66 IN | SYSTOLIC BLOOD PRESSURE: 153 MMHG | HEART RATE: 61 BPM | WEIGHT: 151 LBS

## 2024-04-09 DIAGNOSIS — N28.1 CYST OF KIDNEY, ACQUIRED: ICD-10-CM

## 2024-04-09 PROCEDURE — 99214 OFFICE O/P EST MOD 30 MIN: CPT

## 2024-04-10 ENCOUNTER — APPOINTMENT (OUTPATIENT)
Dept: UROLOGY | Facility: CLINIC | Age: 89
End: 2024-04-10

## 2024-04-14 PROBLEM — N28.1 RENAL CYST: Status: ACTIVE | Noted: 2024-04-14

## 2024-04-14 NOTE — HISTORY OF PRESENT ILLNESS
[FreeTextEntry1] : 88-year-old male presents for gross hematuria. Developed painless gross hematuria on 3/25/2022.  Had some bother with urination at night, had hesitancy, was straining and subsequently developed frequency of every 5 minutes. Saw PCP on 3/26/2024, had urine test and was started on Ciprofloxacin.   Hematuria subsequently resolved. Saw Dr. Dodge.  Was started on Flomax.  Had CT urogram.  Recommended cystoscopy. Has reasonable stream, daytime frequency 2-4 times and nocturia 2-3 times. Denies hesitancy, straining, intermittency, urgency, incontinence or sense of incomplete emptying.  Denies dysuria, lower abdominal or flank pain, nausea, vomiting, fever, chills or rigors.  Not sexually active.  No personal history or family history of kidney stone.  Past smoker. 1 PPD for 40 years. Retired salesman.  Also worked as a  at [employer]. Had lung cancer in 1992.

## 2024-04-14 NOTE — ASSESSMENT
[FreeTextEntry1] : Reviewed records. Discussed labs and imaging.   Benign Prostatic Hyperplasia: Discussed treatment options, will continue Flomax.   Renal cyst: Discussed that Renal cysts are a common finding on routine radiological studies. Autopsy studies in patients over the age of 50 reveal greater than a 50% chance of having at least one simple renal cyst. And that renal cysts may be classified as simple or complex depending how they look on imaging. Discussed complexity of renal cysts and its implications as regards malignancy.   Gross hematuria:  Discussed work up of hematuria so far. Recommended Cystoscopy. Discussed risks and benefits.  Patient hesitant but will consider it.  Return to office in 4 weeks or sooner if any issues. will do uroflow and check post void residual.  Possible cystoscopy.

## 2024-04-14 NOTE — PHYSICAL EXAM
[Normal Appearance] : normal appearance [General Appearance - In No Acute Distress] : no acute distress [] : no respiratory distress [Normal Station and Gait] : the gait and station were normal for the patient's age [Oriented To Time, Place, And Person] : oriented to person, place, and time [de-identified] : normal peripheral circulation

## 2024-04-17 RX ORDER — CIPROFLOXACIN HYDROCHLORIDE 500 MG/1
500 TABLET, FILM COATED ORAL
Qty: 14 | Refills: 0 | Status: COMPLETED | COMMUNITY
Start: 2024-03-27 | End: 2024-04-17

## 2024-04-17 RX ORDER — TAMSULOSIN HYDROCHLORIDE 0.4 MG/1
0.4 CAPSULE ORAL
Qty: 90 | Refills: 1 | Status: ACTIVE | COMMUNITY
Start: 2024-03-27 | End: 1900-01-01

## 2024-05-07 ENCOUNTER — APPOINTMENT (OUTPATIENT)
Dept: UROLOGY | Facility: CLINIC | Age: 89
End: 2024-05-07
Payer: MEDICARE

## 2024-05-07 VITALS
HEART RATE: 81 BPM | WEIGHT: 154 LBS | BODY MASS INDEX: 24.86 KG/M2 | DIASTOLIC BLOOD PRESSURE: 70 MMHG | SYSTOLIC BLOOD PRESSURE: 175 MMHG

## 2024-05-07 DIAGNOSIS — N13.8 BENIGN PROSTATIC HYPERPLASIA WITH LOWER URINARY TRACT SYMPMS: ICD-10-CM

## 2024-05-07 DIAGNOSIS — R31.0 GROSS HEMATURIA: ICD-10-CM

## 2024-05-07 DIAGNOSIS — N40.1 BENIGN PROSTATIC HYPERPLASIA WITH LOWER URINARY TRACT SYMPMS: ICD-10-CM

## 2024-05-07 LAB
BILIRUB UR QL STRIP: NORMAL
GLUCOSE UR-MCNC: NORMAL
HCG UR QL: 0.2 EU/DL
HGB UR QL STRIP.AUTO: NORMAL
KETONES UR-MCNC: NORMAL
LEUKOCYTE ESTERASE UR QL STRIP: NORMAL
NITRITE UR QL STRIP: NORMAL
PH UR STRIP: 7
PROT UR STRIP-MCNC: NORMAL
SP GR UR STRIP: 1.02

## 2024-05-07 PROCEDURE — 52000 CYSTOURETHROSCOPY: CPT

## 2024-05-07 PROCEDURE — 99214 OFFICE O/P EST MOD 30 MIN: CPT | Mod: 25

## 2024-05-14 PROBLEM — N40.1 BPH WITH OBSTRUCTION/LOWER URINARY TRACT SYMPTOMS: Status: ACTIVE | Noted: 2024-04-14

## 2024-05-14 PROBLEM — R31.0 HEMATURIA, GROSS: Status: ACTIVE | Noted: 2024-03-27

## 2024-05-14 NOTE — HISTORY OF PRESENT ILLNESS
[FreeTextEntry1] : 88-year-old male presents for gross hematuria. Developed painless gross hematuria on 3/25/2022.  Had some bother with urination at night, had hesitancy, was straining and subsequently developed frequency of every 5 minutes. Saw PCP on 3/26/2024, had urine test and was started on Ciprofloxacin.   Hematuria subsequently resolved. Saw Dr. Dodge.  Was started on Flomax.  Had CT urogram.  Recommended cystoscopy. Has reasonable stream, daytime frequency 2-4 times and nocturia 2-3 times. Denies hesitancy, straining, intermittency, urgency, incontinence or sense of incomplete emptying.  Denies dysuria, lower abdominal or flank pain, nausea, vomiting, fever, chills or rigors.  Not sexually active.  No personal history or family history of kidney stone.  Past smoker. 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 Had lung cancer in 1992.

## 2024-05-14 NOTE — PHYSICAL EXAM
[Normal Appearance] : normal appearance [General Appearance - In No Acute Distress] : no acute distress [] : no respiratory distress [Normal Station and Gait] : the gait and station were normal for the patient's age [Oriented To Time, Place, And Person] : oriented to person, place, and time [de-identified] : normal peripheral circulation

## 2024-05-14 NOTE — HISTORY OF PRESENT ILLNESS
[FreeTextEntry1] : 88-year-old male presents for gross hematuria. Developed painless gross hematuria on 3/25/2022.  Had some bother with urination at night, had hesitancy, was straining and subsequently developed frequency of every 5 minutes. Saw PCP on 3/26/2024, had urine test and was started on Ciprofloxacin.   Hematuria subsequently resolved. Saw Dr. Dodge.  Was started on Flomax.  Had CT urogram.  Recommended cystoscopy. Has reasonable stream, daytime frequency 2-4 times and nocturia 2-3 times. Denies hesitancy, straining, intermittency, urgency, incontinence or sense of incomplete emptying.  Denies dysuria, lower abdominal or flank pain, nausea, vomiting, fever, chills or rigors.  Not sexually active.  No personal history or family history of kidney stone.  Past smoker. 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. Had lung cancer in 1992.

## 2024-05-14 NOTE — ASSESSMENT
----- Message from Elisha Romero DO sent at 1/25/2019 11:40 AM EST -----  Call pt - CThead normal - no abnormal residual after previous head injury   [FreeTextEntry1] : Reviewed records. Discussed labs and imaging.   Benign Prostatic Hyperplasia: Discussed treatment options, will continue Flomax.   Renal cyst: Discussed that Renal cysts are a common finding on routine radiological studies. Autopsy studies in patients over the age of 50 reveal greater than a 50% chance of having at least one simple renal cyst. And that renal cysts may be classified as simple or complex depending how they look on imaging. Discussed complexity of renal cysts and its implications as regards malignancy.   Gross hematuria:  Discussed work up of hematuria so far. Recommended Cystoscopy. Discussed risks and benefits.  Patient hesitant but will consider it.  Return to office in 4 weeks or sooner if any issues. will do uroflow and check post void residual.  Possible cystoscopy.

## 2024-05-14 NOTE — PHYSICAL EXAM
[Normal Appearance] : normal appearance [General Appearance - In No Acute Distress] : no acute distress [] : no respiratory distress [Normal Station and Gait] : the gait and station were normal for the patient's age [Oriented To Time, Place, And Person] : oriented to person, place, and time [de-identified] : normal peripheral circulation

## 2024-05-14 NOTE — ADDENDUM
[FreeTextEntry1] :  Lillian ASKEW assisted in documentation on 05/07/2024  acting as a scribe for Dr. Jaydon Joyce.

## 2024-08-06 ENCOUNTER — APPOINTMENT (OUTPATIENT)
Dept: UROLOGY | Facility: CLINIC | Age: 89
End: 2024-08-06

## 2024-08-06 PROCEDURE — 99213 OFFICE O/P EST LOW 20 MIN: CPT

## 2024-08-06 NOTE — ASSESSMENT
[FreeTextEntry1] :  08/06/2024: Discussed treatment options including starting Finasteride. Patient would like to hold off.  Will continue Flomax.   Reviewed records. Discussed labs and imaging.   Gross hematuria: Discussed work up of hematuria so far. Recommended Cystoscopy. Discussed risks and benefits.  Patient agreeable. Informed consent obtained.  On Cystoscopy today: Bi lobar prostatic enlargement with small intravesical protrusion. Dilated blood vessels overlying the bladder neck. Along the right ureteric orifice, erythematous and edematous mucosa was seen with congested blood vessel.   Also, urethral mucosa overlying external urethral sphincter was friable.   Discussed treatment options. Patient will continue to observe for now.   If again has gross hematuria will do bladder biopsy with fulguration under general anesthesia.  Benign Prostatic Hyperplasia: Post void residual: 84 cc.  Discussed treatment options, will continue Flomax.   Return to office in 6 months or sooner if there are any issues. Will do uroflow and check PVR.

## 2024-08-06 NOTE — PHYSICAL EXAM
[Normal Appearance] : normal appearance [General Appearance - In No Acute Distress] : no acute distress [] : no respiratory distress [Normal Station and Gait] : the gait and station were normal for the patient's age [Oriented To Time, Place, And Person] : oriented to person, place, and time [de-identified] : normal peripheral circulation

## 2024-08-06 NOTE — ADDENDUM
[FreeTextEntry1] :  Lillian ASKEW assisted in documentation on 08/06/2024  acting as a scribe for Dr. Jaydon Joyce.

## 2024-08-06 NOTE — HISTORY OF PRESENT ILLNESS
[FreeTextEntry1] : 08/06/2024: Patient presents for follow-up.  Taking Flomax, no bother with urination.  Same urination. No new episodes of gross hematuria.  At last visit, patient underwent cystoscopy.   05/07/2024:  88-year-old male present for follow-up.  Taking Flomax, has reasonable stream. Daytime frequency 3-4 times and nocturia 0-1 time.  Has not had any new episode of gross hematuria. Otherwise, same urination.    Seen on 4/9/2024 for gross hematuria. Developed painless gross hematuria on 3/25/2024.  Had some bother with urination at night, had hesitancy, was straining and subsequently developed frequency of every 5 minutes. Saw PCP on 3/26/2024, had urine test and was started on Ciprofloxacin.   Hematuria subsequently resolved. Saw Dr. Dodge.  Was started on Flomax.  Had CT urogram.  Recommended cystoscopy. Had reasonable stream, daytime frequency 2-4 times and nocturia 2-3 times. Denied hesitancy, straining, intermittency, urgency, incontinence or sense of incomplete emptying.  Denied dysuria, lower abdominal or flank pain, nausea, vomiting, fever, chills or rigors.  Not sexually active.  No personal history or family history of kidney stone.  Past smoker. 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 Had lung cancer in 1992.

## 2024-08-19 PROBLEM — R31.9 HEMATURIA, UNSPECIFIED TYPE: Noted: 2024-03-26

## 2025-02-04 ENCOUNTER — APPOINTMENT (OUTPATIENT)
Dept: UROLOGY | Facility: CLINIC | Age: 89
End: 2025-02-04

## 2025-02-04 VITALS
HEIGHT: 66 IN | WEIGHT: 150 LBS | OXYGEN SATURATION: 99 % | HEART RATE: 55 BPM | BODY MASS INDEX: 24.11 KG/M2 | DIASTOLIC BLOOD PRESSURE: 71 MMHG | SYSTOLIC BLOOD PRESSURE: 121 MMHG

## 2025-02-04 DIAGNOSIS — N13.8 BENIGN PROSTATIC HYPERPLASIA WITH LOWER URINARY TRACT SYMPMS: ICD-10-CM

## 2025-02-04 DIAGNOSIS — R31.0 GROSS HEMATURIA: ICD-10-CM

## 2025-02-04 DIAGNOSIS — N40.1 BENIGN PROSTATIC HYPERPLASIA WITH LOWER URINARY TRACT SYMPMS: ICD-10-CM

## 2025-02-04 PROCEDURE — 99213 OFFICE O/P EST LOW 20 MIN: CPT

## 2025-08-05 ENCOUNTER — APPOINTMENT (OUTPATIENT)
Dept: UROLOGY | Facility: CLINIC | Age: 89
End: 2025-08-05